# Patient Record
Sex: MALE | Race: ASIAN | NOT HISPANIC OR LATINO | URBAN - METROPOLITAN AREA
[De-identification: names, ages, dates, MRNs, and addresses within clinical notes are randomized per-mention and may not be internally consistent; named-entity substitution may affect disease eponyms.]

---

## 2020-05-28 ENCOUNTER — OFFICE VISIT - HEALTHEAST (OUTPATIENT)
Dept: FAMILY MEDICINE | Facility: CLINIC | Age: 25
End: 2020-05-28

## 2020-05-28 ENCOUNTER — COMMUNICATION - HEALTHEAST (OUTPATIENT)
Dept: FAMILY MEDICINE | Facility: CLINIC | Age: 25
End: 2020-05-28

## 2020-05-28 DIAGNOSIS — S29.012A STRAIN OF MID-BACK, INITIAL ENCOUNTER: ICD-10-CM

## 2020-05-28 DIAGNOSIS — M62.830 PARASPINAL MUSCLE SPASM: ICD-10-CM

## 2020-05-28 RX ORDER — ACETAMINOPHEN 500 MG
1000 TABLET ORAL 3 TIMES DAILY PRN
Qty: 100 TABLET | Refills: 2 | Status: SHIPPED | OUTPATIENT
Start: 2020-05-28

## 2020-05-28 ASSESSMENT — MIFFLIN-ST. JEOR: SCORE: 1737.88

## 2020-07-16 ENCOUNTER — HOSPITAL ENCOUNTER (OUTPATIENT)
Dept: PHYSICAL MEDICINE AND REHAB | Facility: CLINIC | Age: 25
Discharge: HOME | End: 2020-07-16
Attending: FAMILY MEDICINE

## 2020-07-16 DIAGNOSIS — M79.18 MYOFASCIAL PAIN: ICD-10-CM

## 2020-07-16 DIAGNOSIS — M54.50 ACUTE BILATERAL LOW BACK PAIN WITHOUT SCIATICA: ICD-10-CM

## 2020-07-16 RX ORDER — NAPROXEN 500 MG/1
500 TABLET ORAL 2 TIMES DAILY WITH MEALS
Qty: 60 TABLET | Refills: 1 | Status: SHIPPED | OUTPATIENT
Start: 2020-07-16

## 2020-07-16 ASSESSMENT — MIFFLIN-ST. JEOR: SCORE: 1702.72

## 2020-07-20 ENCOUNTER — HOSPITAL ENCOUNTER (OUTPATIENT)
Dept: RADIOLOGY | Facility: HOSPITAL | Age: 25
Discharge: HOME | End: 2020-07-20
Attending: PAIN MEDICINE

## 2020-07-20 DIAGNOSIS — M54.50 ACUTE BILATERAL LOW BACK PAIN WITHOUT SCIATICA: ICD-10-CM

## 2020-07-20 DIAGNOSIS — M79.18 MYOFASCIAL PAIN: ICD-10-CM

## 2020-07-21 ENCOUNTER — COMMUNICATION - HEALTHEAST (OUTPATIENT)
Dept: PHYSICAL MEDICINE AND REHAB | Facility: CLINIC | Age: 25
End: 2020-07-21

## 2021-06-04 VITALS
WEIGHT: 182.1 LBS | BODY MASS INDEX: 30.34 KG/M2 | HEIGHT: 65 IN | OXYGEN SATURATION: 98 % | DIASTOLIC BLOOD PRESSURE: 82 MMHG | HEART RATE: 72 BPM | TEMPERATURE: 97.8 F | SYSTOLIC BLOOD PRESSURE: 120 MMHG | RESPIRATION RATE: 20 BRPM

## 2021-06-04 VITALS — WEIGHT: 172.6 LBS | HEIGHT: 66 IN | BODY MASS INDEX: 27.74 KG/M2

## 2021-06-08 NOTE — PROGRESS NOTES
"HPI -  25 yo male with back pain    He has been having back pain for a year. His partner fell and got injured a year ago and pt was left holding the heavy item - large aluminum plates. Told his supervisor about a week later that back hurt.   The job now uses crane.   He did not go to the doctor to be seen.   He has to bend down and sit up at work. His supervisor advised he get checked out by a doctor    Tried - nothing, no ibuprofen, tylenol, massage    No limits to activity       Patient Active Problem List   Diagnosis     Hearing loss of right ear     Eustachian tube dysfunction, right     No current outpatient medications on file.     Vitals:    05/28/20 1423   BP: 120/82   Pulse: 72   Resp: 20   Temp: 97.8  F (36.6  C)   TempSrc: Oral   SpO2: 98%   Weight: 182 lb 1.6 oz (82.6 kg)   Height: 5' 5\" (1.651 m)     OBJECTIVE:  Vitals listed above within normal limits  General appearance: well groomed, pleasant, well hydrated, nontoxic appearing  ENT: PERRL, throat clear  Neck: neck supple, no lymphadenopathy, no thyromegaly  Lungs: lungs clear to auscultation bilaterally, no wheezes or rhonchi  Heart: regular rate and rhythm, no murmurs, rubs or gallops  Abdomen: soft, nontender  Neuro: no focal deficits, CN II-XII grossly intact, alert and oriented  Psych:  mood stable, appears to have good insight and judgment  MS: normal back exam, able to bend forward and touch toes and extend back, twist at the waist and bend side-to-side, negative straight leg raises, no bony tenderness along the spine      A/P  Mid back strain  - Mid back pain along paraspinal muscles - likely from repetitive motion, heavy lifting and poor ergonomic positions at work  rx naproxen, tylenol  Referral to spine clinic and massage  Encouraged espom salt bath and topical lidocaine roll on   Work letter written    "

## 2021-06-08 NOTE — TELEPHONE ENCOUNTER
Upcoming Appointment Question  When is the appointment: Today  What is your appointment for?:  Patient has in office visit appointment today 05/28 with Dr Montes for back pain.  Who is your appointment scheduled with?: Dr Montes  What is your question/concern?: Wife is asking if ok to accompany patient to appointment , explained masking policy and screened over the phone for Covid-19 symptoms. Please advise patient's wife.  Okay to leave a detailed message?: Yes

## 2021-06-09 NOTE — PATIENT INSTRUCTIONS - HE
Discussed the importance of core strengthening, ROM, stretching exercises with the patient and how each of these entities is important in decreasing pain.  Explained to the patient that the purpose of physical therapy is to teach the patient a home exercise program.  These exercises need to be performed every day in order to decrease pain and prevent future occurrences of pain.        I have ordered naproxen 500 mg he can take twice a day as needed with food.    I ordered an x-ray of her low back.  Once I reviewed the images I will have 1 of our nurses give you call with results and recommendations.    ~Please call Nurse Navigation line [phone] with any questions or concerns about your treatment plan, if symptoms worsen and you would like to be seen urgently, or if you have problems controlling bladder and bowel function.

## 2021-06-09 NOTE — PROGRESS NOTES
ASSESSMENT: Clint Palmer is a 24 y.o. male who presents for consultation at the request of HE PCP Uzair Chen MD, with a past medical history significant for right ear hearing loss, eustachian tube dysfunction on the right who presents today for new patient evaluation of low back pain:    -Overall patient's physical exam is reassuring he has normal strength and reflexes in his lower extremities.  His pain is likely secondary to myofascial type pain versus lumbar spondylosis without myelopathy.    Patient is neurologically intact on exam. No myelopathic or red flag symptoms.     TAYO Score: 28    WHO 5: 10     Diagnoses and all orders for this visit:    Acute bilateral low back pain without sciatica  -     XR Lumbar Spine 2 or 3 VWS; Future; Expected date: 07/23/2020  -     Ambulatory referral to Physical Therapy  -     naproxen (NAPROSYN) 500 MG tablet; Take 1 tablet (500 mg total) by mouth 2 (two) times a day with meals. Take with food/water to prevent stomach upset.  Dispense: 60 tablet; Refill: 1    Myofascial pain  -     XR Lumbar Spine 2 or 3 VWS; Future; Expected date: 07/23/2020  -     Ambulatory referral to Physical Therapy  -     naproxen (NAPROSYN) 500 MG tablet; Take 1 tablet (500 mg total) by mouth 2 (two) times a day with meals. Take with food/water to prevent stomach upset.  Dispense: 60 tablet; Refill: 1      PLAN:  Reviewed spine anatomy and disease process. Discussed diagnosis and treatment options with the patient today. A shared decision making model was used.  The patient's values and choices were respected. The following represents what was discussed and decided upon by the provider and the patient.      -DIAGNOSTIC TESTS:  I have ordered x-rays of his lumbar spine.  Once I reviewed images of 1 of our nurses give him a call with results and recommendations.    -PHYSICAL THERAPY: I have ordered [facility] physical therapy for his low back.  I like him to have a home-going exercise program that he can do  on a daily basis.    Discussed the importance of core strengthening, ROM, stretching exercises with the patient and how each of these entities is important in decreasing pain.  Explained to the patient that the purpose of physical therapy is to teach the patient a home exercise program.  These exercises need to be performed every day in order to decrease pain and prevent future occurrences of pain.        -MEDICATIONS: I have ordered naproxen 500 mg that he can take twice a day as needed with food.  Fine for him to also take acetaminophen up to 1000 mg 3 times daily.  -  Discussed multiple medication options today with patient. Discussed risks, side effects, and proper use of medications. Patient verbalized understanding.    -INTERVENTIONS: No interventions at this time.  Discussed risks and benefits of injections with patient today.    -PATIENT EDUCATION: We discussed pain management in a multimodal fashion including physical therapy, medication management, possible future injections.  We discussed that if he needed FMLA paperwork filled out that I be happy to do this so he can attend his physical therapy sessions.    -FOLLOW-UP:   The patient will follow-up in 4 weeks.    Advised patient to call the [facility] if symptoms worsen or you have problems controlling bladder and bowel function.   ______________________________________________________________________    SUBJECTIVE:  HPI:  Clint Palmer  Is a 24 y.o. male who presents today for new patient evaluation of low back pain.  The patient was seen by his primary care provider on 5/28/2020 for low back pain.  Patient was referred to the spine center for further evaluation.  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  Other worker fell down and he was left remaining holding the entire weight of the part.  The other worker had been written up for this work-related injury,  however the patient was not.  Patient's been having low back pain that is been worsening over the last 2 months.  He has had work restrictions of lifting no more than 10 to 15 pounds, however even with this his pain has been moderate to severe.  Pain is worse with bending over especially at work when he works on machines.  He has not found anything that is been helpful as of yet, however he is not tried ibuprofen or ice or heat.  Is not had any physical therapy.  He cannot have any x-rays.  He like to have imaging to see if there is anything that is going on.  He like to have imaging prior to doing his physical therapy exercises which is reasonable.  Pain is in his low back and is more on the right side than the left and is achy in nature.  His pain today is 5/10 is worse is 7/10 is best a 0/10.  He is here with his wife who is help with the planning process.   services is used during the entirety of the encounter.    -Treatment to Date: None    -Medications:    Current Outpatient Medications on File Prior to Encounter   Medication Sig Dispense Refill     acetaminophen (TYLENOL EXTRA STRENGTH) 500 MG tablet Take 2 tablets (1,000 mg total) by mouth 3 (three) times a day as needed for pain. 100 tablet 2     No current facility-administered medications on file prior to encounter.        No Known Allergies    Past medical history: Hearing loss    Patient Active Problem List   Diagnosis     Hearing loss of right ear     Eustachian tube dysfunction, right       Past surgical history: None    Family history: Denies family history of cancer, diabetes, heart disease, hypertension    Reviewed past medical, surgical, and family history with patient found on new patient intake packet located in [employer] tab.     SOCIAL HX: He is .  He denies smoking, drinking alcohol, or using recreational drugs.  He is a .    ROS:  Specifically negative for bowel/bladder dysfunction, balance changes, headache,  "dizziness, foot drop, fevers, chills, appetite changes, nausea/vomiting, unexplained weight loss. Otherwise 13 systems reviewed are negative. Please see the patient's intake questionnaire from today for details.    OBJECTIVE:  /88   Pulse 73   Temp 98.4  F (36.9  C) (Oral)   Resp 14   Ht 5' 5.5\" (1.664 m)   Wt 172 lb 9.6 oz (78.3 kg)   SpO2 98%   BMI 28.29 kg/m      PHYSICAL EXAMINATION:    --CONSTITUTIONAL:  Vital signs as above.  No acute distress.  The patient is well nourished and well groomed.  --PSYCHIATRIC:  Appropriate mood and affect. The patient is awake, alert, oriented to person, place, time and answering questions appropriately with clear speech.    --SKIN:  Skin over the face, bilateral lower extremities, and posterior torso is clean, dry, intact without rashes.    --RESPIRATORY: Normal rhythm and effort. No abnormal accessory muscle breathing patterns noted.   --ABDOMINAL:  Soft, non-distended, non-tender throughout all quadrants.   --STANDING EXAMINATION:  Normal lumbar lordosis noted, no lateral shift.  --MUSCULOSKELETAL: Lumbar spine inspection reveals no evidence of deformity. Range of motion is not limited in lumbar flexion, extension, lateral rotation.  He has tenderness palpation along his low lumbar paraspinal muscles bilaterally. Straight leg raising in the supine position is negative to radicular pain.   --SACROILIAC JOINT: Negative distraction.  Negative Saba's with reproduction of pain to affected extremity.  One Finger point test negative.  --GROSS MOTOR: Gait is non-antalgic. Easily arises from a seated position. Toe walking and heel walking are normal without significant difficulty.    --LOWER EXTREMITY MOTOR TESTING:  Plantar flexion left 5/5, right 5/5   Dorsiflexion left 5/5, right 5/5   Great toe MTP extension left 5/5, right 5/5  Knee flexion left 5/5, right 5/5  Knee extension left 5/5, right 5/5   Hip flexion left 5/5, right 5/5  Hip abduction left 5/5, right " 5/5  Hip adduction left 5/5, right 5/5   --HIPS: Full range of motion bilaterally.  Essential test is negative bilaterally.  --NEUROLOGICAL:  2/4 patellar, medial hamstring, and achilles reflexes bilaterally.  Sensation to light touch is intact in the bilateral L4, L5, and S1 dermatomes. Babinski is negative. No clonus.  --VASCULAR:  2/4 dorsalis pedis and posterior tibialsi pulses bilaterally.  Bilateral lower extremities are warm.  There is no pitting edema of the bilateral lower extremities.    RESULTS: Prior medical records from [facility] primary care provider were reviewed today.

## 2021-06-09 NOTE — TELEPHONE ENCOUNTER
No response. Call placed to pt's phone # with assistance of [employer] . Pt stated understanding of results and recommendations. Pt has scheduled follow up with provider on 8/18/2020.

## 2021-06-09 NOTE — TELEPHONE ENCOUNTER
----- Message from Anjel Lang, DO sent at 7/21/2020 11:30 AM CDT -----  Please call the patient and let him know that I reviewed x-ray of his lumbar spine.  There are no fractures.  There is no arthritis in his low back.  I recommend that he start physical therapy at this time.  Recommend he follow-up as scheduled.

## 2021-06-18 NOTE — PATIENT INSTRUCTIONS - HE
Patient Instructions by Lora Comer MD at 5/28/2020  2:00 PM     Author: Lora Comer MD Service: -- Author Type: Physician    Filed: 5/28/2020  3:58 PM Encounter Date: 5/28/2020 Status: Signed    : Lora Comer MD (Physician)

## 2021-06-20 NOTE — LETTER
Letter by Lora Comer MD at      Author: Lora Comer MD Service: -- Author Type: --    Filed:  Encounter Date: 5/28/2020 Status: (Other)         May 28, 2020     Patient: Clint Palmer   YOB: 1995   Date of Visit: 5/28/2020       To Whom It May Concern:    It is my medical opinion that Clint Palmer may return to light duty immediately with the following restrictions: no lifting more than 30 pounds and allow frequent position changes in work station. .    Spine clinic will re-evaluate for any change in restriction.   Consider ergonomic assessment of work station.     If you have any questions or concerns, please don't hesitate to call.    Sincerely,        Electronically signed by Lora Comer MD

## 2021-07-06 ENCOUNTER — AMBULATORY - HEALTHEAST (OUTPATIENT)
Dept: NURSING | Facility: CLINIC | Age: 26
End: 2021-07-06

## 2021-07-06 DIAGNOSIS — Z23 ENCOUNTER FOR IMMUNIZATION: Primary | ICD-10-CM

## 2021-07-27 ENCOUNTER — IMMUNIZATION (OUTPATIENT)
Dept: NURSING | Facility: CLINIC | Age: 26
End: 2021-07-27
Attending: PEDIATRICS
Payer: COMMERCIAL

## 2021-07-27 DIAGNOSIS — Z23 ENCOUNTER FOR IMMUNIZATION: ICD-10-CM

## 2021-07-27 PROCEDURE — 0002A PR COVID VAC PFIZER DIL RECON 30 MCG/0.3 ML IM: CPT

## 2021-07-27 PROCEDURE — 91300 PR COVID VAC PFIZER DIL RECON 30 MCG/0.3 ML IM: CPT
